# Patient Record
Sex: FEMALE | Race: OTHER | HISPANIC OR LATINO | URBAN - METROPOLITAN AREA
[De-identification: names, ages, dates, MRNs, and addresses within clinical notes are randomized per-mention and may not be internally consistent; named-entity substitution may affect disease eponyms.]

---

## 2023-08-08 ENCOUNTER — EMERGENCY (EMERGENCY)
Facility: HOSPITAL | Age: 63
LOS: 1 days | Discharge: HOME | End: 2023-08-08
Attending: STUDENT IN AN ORGANIZED HEALTH CARE EDUCATION/TRAINING PROGRAM | Admitting: STUDENT IN AN ORGANIZED HEALTH CARE EDUCATION/TRAINING PROGRAM
Payer: COMMERCIAL

## 2023-08-08 VITALS
RESPIRATION RATE: 17 BRPM | SYSTOLIC BLOOD PRESSURE: 125 MMHG | TEMPERATURE: 98 F | HEART RATE: 70 BPM | OXYGEN SATURATION: 99 % | DIASTOLIC BLOOD PRESSURE: 68 MMHG

## 2023-08-08 VITALS
DIASTOLIC BLOOD PRESSURE: 82 MMHG | HEART RATE: 95 BPM | SYSTOLIC BLOOD PRESSURE: 112 MMHG | TEMPERATURE: 100 F | RESPIRATION RATE: 18 BRPM | OXYGEN SATURATION: 98 %

## 2023-08-08 LAB
ALBUMIN SERPL ELPH-MCNC: 4.3 G/DL — SIGNIFICANT CHANGE UP (ref 3.3–5)
ALP SERPL-CCNC: 138 U/L — HIGH (ref 40–120)
ALT FLD-CCNC: 187 U/L — HIGH (ref 4–33)
ANION GAP SERPL CALC-SCNC: 13 MMOL/L — SIGNIFICANT CHANGE UP (ref 7–14)
APPEARANCE UR: ABNORMAL
AST SERPL-CCNC: 211 U/L — HIGH (ref 4–32)
BACTERIA # UR AUTO: ABNORMAL /HPF
BASOPHILS # BLD AUTO: 0.04 K/UL — SIGNIFICANT CHANGE UP (ref 0–0.2)
BASOPHILS NFR BLD AUTO: 0.3 % — SIGNIFICANT CHANGE UP (ref 0–2)
BILIRUB SERPL-MCNC: 0.8 MG/DL — SIGNIFICANT CHANGE UP (ref 0.2–1.2)
BILIRUB UR-MCNC: NEGATIVE — SIGNIFICANT CHANGE UP
BUN SERPL-MCNC: 16 MG/DL — SIGNIFICANT CHANGE UP (ref 7–23)
CALCIUM SERPL-MCNC: 9.9 MG/DL — SIGNIFICANT CHANGE UP (ref 8.4–10.5)
CAST: 1 /LPF — SIGNIFICANT CHANGE UP (ref 0–4)
CHLORIDE SERPL-SCNC: 100 MMOL/L — SIGNIFICANT CHANGE UP (ref 98–107)
CO2 SERPL-SCNC: 24 MMOL/L — SIGNIFICANT CHANGE UP (ref 22–31)
COLOR SPEC: YELLOW — SIGNIFICANT CHANGE UP
CREAT SERPL-MCNC: 1.16 MG/DL — SIGNIFICANT CHANGE UP (ref 0.5–1.3)
DIFF PNL FLD: ABNORMAL
EGFR: 53 ML/MIN/1.73M2 — LOW
EOSINOPHIL # BLD AUTO: 0.01 K/UL — SIGNIFICANT CHANGE UP (ref 0–0.5)
EOSINOPHIL NFR BLD AUTO: 0.1 % — SIGNIFICANT CHANGE UP (ref 0–6)
GLUCOSE SERPL-MCNC: 154 MG/DL — HIGH (ref 70–99)
GLUCOSE UR QL: NEGATIVE MG/DL — SIGNIFICANT CHANGE UP
HCT VFR BLD CALC: 41.2 % — SIGNIFICANT CHANGE UP (ref 34.5–45)
HGB BLD-MCNC: 14.1 G/DL — SIGNIFICANT CHANGE UP (ref 11.5–15.5)
IANC: 10.82 K/UL — HIGH (ref 1.8–7.4)
IMM GRANULOCYTES NFR BLD AUTO: 0.4 % — SIGNIFICANT CHANGE UP (ref 0–0.9)
KETONES UR-MCNC: NEGATIVE MG/DL — SIGNIFICANT CHANGE UP
LEUKOCYTE ESTERASE UR-ACNC: ABNORMAL
LYMPHOCYTES # BLD AUTO: 1.21 K/UL — SIGNIFICANT CHANGE UP (ref 1–3.3)
LYMPHOCYTES # BLD AUTO: 9 % — LOW (ref 13–44)
MCHC RBC-ENTMCNC: 30.9 PG — SIGNIFICANT CHANGE UP (ref 27–34)
MCHC RBC-ENTMCNC: 34.2 GM/DL — SIGNIFICANT CHANGE UP (ref 32–36)
MCV RBC AUTO: 90.4 FL — SIGNIFICANT CHANGE UP (ref 80–100)
MONOCYTES # BLD AUTO: 1.31 K/UL — HIGH (ref 0–0.9)
MONOCYTES NFR BLD AUTO: 9.7 % — SIGNIFICANT CHANGE UP (ref 2–14)
NEUTROPHILS # BLD AUTO: 10.82 K/UL — HIGH (ref 1.8–7.4)
NEUTROPHILS NFR BLD AUTO: 80.5 % — HIGH (ref 43–77)
NITRITE UR-MCNC: NEGATIVE — SIGNIFICANT CHANGE UP
NRBC # BLD: 0 /100 WBCS — SIGNIFICANT CHANGE UP (ref 0–0)
NRBC # FLD: 0 K/UL — SIGNIFICANT CHANGE UP (ref 0–0)
PH UR: 6 — SIGNIFICANT CHANGE UP (ref 5–8)
PLATELET # BLD AUTO: 213 K/UL — SIGNIFICANT CHANGE UP (ref 150–400)
POTASSIUM SERPL-MCNC: 4.1 MMOL/L — SIGNIFICANT CHANGE UP (ref 3.5–5.3)
POTASSIUM SERPL-SCNC: 4.1 MMOL/L — SIGNIFICANT CHANGE UP (ref 3.5–5.3)
PROT SERPL-MCNC: 7.7 G/DL — SIGNIFICANT CHANGE UP (ref 6–8.3)
PROT UR-MCNC: 30 MG/DL
RBC # BLD: 4.56 M/UL — SIGNIFICANT CHANGE UP (ref 3.8–5.2)
RBC # FLD: 12.6 % — SIGNIFICANT CHANGE UP (ref 10.3–14.5)
RBC CASTS # UR COMP ASSIST: 2 /HPF — SIGNIFICANT CHANGE UP (ref 0–4)
SODIUM SERPL-SCNC: 137 MMOL/L — SIGNIFICANT CHANGE UP (ref 135–145)
SP GR SPEC: 1.01 — SIGNIFICANT CHANGE UP (ref 1–1.03)
SQUAMOUS # UR AUTO: 3 /HPF — SIGNIFICANT CHANGE UP (ref 0–5)
UROBILINOGEN FLD QL: 0.2 MG/DL — SIGNIFICANT CHANGE UP (ref 0.2–1)
WBC # BLD: 13.44 K/UL — HIGH (ref 3.8–10.5)
WBC # FLD AUTO: 13.44 K/UL — HIGH (ref 3.8–10.5)
WBC UR QL: 149 /HPF — HIGH (ref 0–5)

## 2023-08-08 PROCEDURE — 74177 CT ABD & PELVIS W/CONTRAST: CPT | Mod: 26,MA

## 2023-08-08 PROCEDURE — 99285 EMERGENCY DEPT VISIT HI MDM: CPT

## 2023-08-08 RX ORDER — SODIUM CHLORIDE 9 MG/ML
1000 INJECTION INTRAMUSCULAR; INTRAVENOUS; SUBCUTANEOUS ONCE
Refills: 0 | Status: COMPLETED | OUTPATIENT
Start: 2023-08-08 | End: 2023-08-08

## 2023-08-08 RX ORDER — KETOROLAC TROMETHAMINE 30 MG/ML
15 SYRINGE (ML) INJECTION ONCE
Refills: 0 | Status: DISCONTINUED | OUTPATIENT
Start: 2023-08-08 | End: 2023-08-08

## 2023-08-08 RX ORDER — CEFTRIAXONE 500 MG/1
1000 INJECTION, POWDER, FOR SOLUTION INTRAMUSCULAR; INTRAVENOUS ONCE
Refills: 0 | Status: COMPLETED | OUTPATIENT
Start: 2023-08-08 | End: 2023-08-08

## 2023-08-08 RX ADMIN — SODIUM CHLORIDE 1000 MILLILITER(S): 9 INJECTION INTRAMUSCULAR; INTRAVENOUS; SUBCUTANEOUS at 19:11

## 2023-08-08 RX ADMIN — Medication 15 MILLIGRAM(S): at 22:38

## 2023-08-08 RX ADMIN — CEFTRIAXONE 100 MILLIGRAM(S): 500 INJECTION, POWDER, FOR SOLUTION INTRAMUSCULAR; INTRAVENOUS at 21:38

## 2023-08-08 NOTE — ED PROVIDER NOTE - NSFOLLOWUPINSTRUCTIONS_ED_ALL_ED_FT
It was a pleasure caring for you today    You were seen in the ER today for ____ .    Please follow up with your primary care doctor within 1 - 3 days. Call and let them know you were seen in the ER today.   Bring the results of your blood work and imaging with you to your appointment, if applicable.    For pain, please take acetaminophen 650 mg every 6 hours for pain. Additionally, you can also take ibuprofen 200 mg every 6-8 hours for pain.    Return to the ER for any worsening symptoms or concerns, including chest pain, shortness of breath, lightheadedness, weakness, or any other concerns. RETURN TO EMERGENCY DEPT IN 1-2 DAYS.    Please follow up with your primary care doctor within 1 - 3 days. Call and let them know you were seen in the ER today.   Bring the results of your blood work and imaging with you to your appointment, if applicable.    For pain, please take acetaminophen 650 mg every 6 hours for pain. Additionally, you can also take ibuprofen 200 mg every 6-8 hours for pain.    Return to the ER for any worsening symptoms or concerns, including chest pain, shortness of breath, lightheadedness, weakness, or any other concerns.      Pielonefritis en los adultos  Pyelonephritis, Adult  Body outline showing the urinary system, with a close-up of a normal kidney and an infected kidney.  La pielonefritis es elyse infección que se produce en el riñón. Los riñones son los órganos que filtran la ralph y eliminan los residuos del torrente sanguíneo a través de la orina. La orina pasa desde los riñones, a través de tubos llamados uréteres, hacia la vejiga. Hay dos tipos principales de pielonefritis:  Infecciones que se inician rápidamente sin síntomas previos (pielonefritis aguda).  Infecciones que persisten eleni un período prolongado (pielonefritis crónica).  En la mayoría de los casos, la infección desaparece con el tratamiento y no causa otros problemas. Las infecciones más graves o crónicas a veces pueden propagarse al torrente sanguíneo u ocasionar otros problemas en los riñones.    ¿Cuáles son las causas?  Por lo general, entre las causas de esta afección, se incluyen las siguientes:  Bacterias que viajan desde la vejiga al riñón. Moraga puede ocurrir después de chavez tenido elyse infección en la vejiga (cistitis) o elyse infección urinaria (IU).  Infecciones de la vejiga causadas por bacterias que viajan desde el torrente sanguíneo hasta el riñón.  ¿Qué incrementa el riesgo?  Es más probable que esta afección se manifieste en:  Mujeres embarazadas.  Personas de edad avanzada.  Personas que tienen alguna de estas afecciones:  Diabetes.  Inflamación de la próstata (prostatitis) en los hombres.  Cálculos renales o en la vejiga.  Otras anormalidades del riñón o de la uretra.  Cáncer.  Las personas que tienen elyse sonda vesical.  Las personas que son sexualmente activas.  Las mujeres que usan espermicidas.  Las personas que tuvieron elyse IU previa.  ¿Cuáles son los signos o los síntomas?  Los síntomas de esta afección incluyen:  Ganas frecuentes de orinar.  Necesidad intensa o persistente de orinar.  Sensación de ardor o escozor al orinar.  Dolor abdominal.  Dolor de espalda.  Dolor al costado del cuerpo o en la fosa lumbar.  Fiebre o escalofríos.  Ralph en la orina u orina de color oscuro.  Náuseas o vómitos.  ¿Cómo se diagnostica?  Esta afección se puede diagnosticar en función de lo siguiente:  Los antecedentes médicos y un examen físico.  Análisis de orina.  Análisis de ralph.  También pueden hacerle estudios de diagnóstico por imágenes de los riñones, por ejemplo, elyse ecografía o elyse exploración por tomografía computarizada (TC).    ¿Cómo se trata?  El tratamiento de esta afección puede depender de la gravedad de la infección.  Si la infección es leve y se detecta rápidamente, pueden administrarle antibióticos por boca (vía oral). Deberá giancarlo líquido para permanecer hidratado.  Si la infección es más grave, es posible que deban hospitalizarlo para administrarle antibióticos directamente en elyse vena a través de elyse vía intravenosa (IV). Quizás también deban administrarle líquidos a través de elyse vía intravenosa si no puede permanecer brian hidratado. Después de la hospitalización, es posible que deba giancarlo antibióticos eleni un tiempo.  Podrán prescribirle otros tratamientos según la causa de la infección.    Siga estas instrucciones en hedrick casa:  Medicamentos    [place] hedrick antibiótico rashad se lo haya indicado el médico. No deje de giancarlo el antibiótico aunque comience a sentirse mejor.  Use los medicamentos de venta thu y los recetados solamente rashad se lo haya indicado el médico.  Instrucciones generales    Three cups showing dark yellow, yellow, and pale yellow urine.  Rosalia suficiente líquido rashad para mantener la orina de color amarillo pálido.  Evite la cafeína, el té y las bebidas gaseosas. Estas sustancias irritan la vejiga.  Orine con frecuencia. Evite retener la orina eleni largos períodos.  Orine antes y después de las relaciones sexuales.  Después de las deposiciones, las mujeres deben higienizarse desde adelante hacia atrás. Use cada papel solamente elyse vez.  Concurra a todas las visitas de seguimiento rashad se lo haya indicado el médico. [place]aga es importante.  Comuníquese con un médico si:  Los síntomas no mejoran después de 2 shannan de tratamiento.  Stephenie síntomas empeoran.  Tiene fiebre.  Solicite ayuda de inmediato si:  No puede giancarlo los antibióticos ni ingerir líquidos.  Tiene escalofríos.  Vomita.  Siente un dolor intenso en la espalda o en la fosa lumbar.  Se desmaya o siente elyse debilidad extrema.  Resumen  La pielonefritis es elyse infección urinaria (IU) que se produce en el riñón.  El tratamiento de esta afección puede depender de la gravedad de la infección.  [place] hedrick antibiótico rashad se lo haya indicado el médico. No deje de giancarlo el antibiótico aunque comience a sentirse mejor.  Rosalia suficiente líquido rashad para mantener la orina de color amarillo pálido.  Concurra a todas las visitas de seguimiento rashad se lo haya indicado el médico. [place] es importante.  Esta información no tiene rashad fin reemplazar el consejo del médico. Asegúrese de hacerle al médico cualquier pregunta que tenga.

## 2023-08-08 NOTE — ED ADULT TRIAGE NOTE - CHIEF COMPLAINT QUOTE
pt c/o of fever, chills and lower abd pain for days, c/o of dysuria as well, pt with hx multiple UTI

## 2023-08-08 NOTE — ED PROVIDER NOTE - OBJECTIVE STATEMENT
63-year-old female with past medical history of cholecystectomy and HLD on statin.  Presents to the ED with 4 days of fevers, chills and lower abdominal pain.  Patient states that her symptoms are associated with dysuria, vomiting, and decreased voiding stream but denies dribbling.  Patient also endorses headache, vomiting, lightheadedness and fatigue.  Patient denies bloody urine but says that it is white and turbid.  Patient also says that she has not eaten this morning due to her symptoms.  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

## 2023-08-08 NOTE — ED PROVIDER NOTE - PHYSICAL EXAMINATION
Const: Awake, alert, no acute distress.  Well appearing.  Moving comfortably on stretcher.  HEENT: NC/AT.  Moist mucous membranes.  No pharyngeal erythema, no exudates.  Eyes: Extraocular movements intact b/l.  Conjunctiva pink.  No scleral icterus.  Neck: Neck supple, full ROM without pain.  Cardiac: Regular rate and regular rhythm. S1 S2 present.  Peripheral pulses 2+ and symmetric. No LE edema.  Resp: Speaking in full sentences. No evidence of respiratory distress.  Breath sounds clear to auscultation b/l. Normal chest excursion.   Abd: Mild suprapubic tenderness to palpation. Non-distended, no overlying skin changes.  Soft, no rigidity, no rebound tenderness.  No palpable masses.  Normal bowel sounds in all 4 quadrants.  Back: Spine midline and non-tender. CVA tenderness.   Skin: Normal coloration.  No rashes, abrasions or lacerations.  Neuro: Awake, alert & oriented x 3.  Moves all extremities spontaneously.  No focal deficits.

## 2023-08-08 NOTE — ED PROVIDER NOTE - PATIENT PORTAL LINK FT
You can access the FollowMyHealth Patient Portal offered by [facility] by registering at the following website: [email or web address]. By joining [employer]’s FollowMyHealth portal, you will also be able to view your health information using other applications (apps) compatible with our system.

## 2023-08-08 NOTE — ED ADULT NURSE REASSESSMENT NOTE - NS ED NURSE REASSESS COMMENT FT1
pt noted to be shaking, c/o chills, rpt vitals taken, pt afebrile. MD Yates made aware, toradol to be ordered ad given to pt. safety measures maintained

## 2023-08-08 NOTE — ED PROVIDER NOTE - ATTENDING CONTRIBUTION TO CARE
[id number] devante  63-year-old female past medical history hyperlipidemia previously on atorvastatin but DC'd due to well-controlled diet exercise 1 month ago, history of recurrent UTIs presents to ED today for fever chills dysuria pubic pain since last Thursday, 5 days.  Also reports mild intermittent headaches last night.  Patient took Tylenol last night and earlier today for fever reports relief.  Patient reports decreased p.o. feeling ill from fever but denies nausea.  Does report vomiting earlier today.  Denies other abdominal pain other than suprapubic pain.  Denies any back pain.  Denies chest pain shortness of breath cough.  Denies extremity pain weakness numbness or decreased range of motion.  Reports normal bowel movements.  Last took antibiotics for UTI approximately 4 months ago.  Has been hospitalized for UTI in the past once remotely.  No history nephrolithiasis.  Past surgical history: Cholecystectomy ,  remotely.  Exam as above, no CVA tenderness, + suprapubic tenderness superior tenderness.  Patient with constellation of symptoms clinically suspicious for UTI/pyelo-.  Patient overall well-appearing tolerating p.o. until today.  Plan labs symptom relief reassess.  Noted to have leukocytosis normal renal function normal electrolytes.  UA consistent with UTI.  Patient given ceftriaxone.  LFT elevation noted.  No prior LFTs in the system.  Patient denies prior history of abnormal LFT.  Does report has been off statin for at least 1 month.  Describes taking acetaminophen at appropriate doses.  Patient not an alcoholic.  Does not have any upper abdominal pain or history of diarrhea.  Given symptoms plus LFT elevation will obtain CT.  Discussed with patient and son at bedside that may ultimately require outpatient follow-up with GI

## 2023-08-08 NOTE — ED PROVIDER NOTE - CLINICAL SUMMARY MEDICAL DECISION MAKING FREE TEXT BOX
63-year-old female with past medical history of cholecystectomy and HLD on statin.  Presents to the ED with 4 days of fevers, chills and lower abdominal pain.  Patient states that her symptoms are associated with dysuria, vomiting, and decreased voiding stream but denies dribbling.  Patient also endorses headache, vomiting, lightheadedness and fatigue.  Patient denies bloody urine but says that it is white and turbid.  Patient also says that she has not eaten this morning due to her symptoms.  Patient states that she has a history of hypoplastic kidneysand recurrent utis but no history of kidney stones.    Patient had has a low-grade fever.  Physical exam positive for right CVA tenderness and mild tenderness to palpation of the suprapubic region.  DDx includes UTI, Diaz, cystitis.  Orders included IV fluids, CBC CMP, UA, UC and CT abdomen.  Dispo pending labs, imaging and reassessment.

## 2023-08-08 NOTE — ED PROVIDER NOTE - PROGRESS NOTE DETAILS
Gerard Yates DO: Patient reassessed feeling better tolerating p.o. liquids and solids.  Reports pain improved.  Denies nausea vomiting.  Discussed with patient and son they would like to be discharged with p.o. antibiotics.  Patient currently traveling from [place] no outpatient follow-up available.  Patient returning to [place] beginning of September.  It vies patient and son to start p.o. antibiotics and return for reassessment in 1 to 2 days.  Patient and son given strict return precautions to return sooner if worsening fever abdominal pain able to tolerate p.o.  They expressed understanding.  Stable for discharge with close follow-up.  Also discussed LFT elevation of unclear etiology.  Patient advised to follow-up regarding LFTs.
